# Patient Record
Sex: FEMALE | Race: WHITE
[De-identification: names, ages, dates, MRNs, and addresses within clinical notes are randomized per-mention and may not be internally consistent; named-entity substitution may affect disease eponyms.]

---

## 2019-04-08 ENCOUNTER — HOSPITAL ENCOUNTER (OUTPATIENT)
Dept: HOSPITAL 47 - RADUSWWP | Age: 44
Discharge: HOME | End: 2019-04-08
Attending: FAMILY MEDICINE
Payer: COMMERCIAL

## 2019-04-08 DIAGNOSIS — N85.8: Primary | ICD-10-CM

## 2019-04-08 PROCEDURE — 76830 TRANSVAGINAL US NON-OB: CPT

## 2019-04-09 NOTE — US
EXAMINATION TYPE: US transvaginal

 

DATE OF EXAM: 4/8/2019

 

COMPARISON: NONE

 

CLINICAL HISTORY: N39.8 Dysfunctional Uterine Bleeding.

 

TECHNIQUE:  Transvaginal (TV).  

 

Date of LMP:  3/11/19, patient still bleeding

 

EXAM MEASUREMENTS:

 

Uterus:  10.5 x 5.7 x 6.3 cm

Endometrial Stripe: not visualized

Right Ovary:  2.3 x 1.4 x 1.6cm 

Left Ovary:  1.8 x 1.3 x 1.1cm 

 

 

 

1. Uterus:  fibroid uterus, largest 2. measuring measuring 2.9 x 3.3 x 3.2cm, 2.3 x 2.3 x 2.2cm, mult
iple nabothians noted

 heterogeneous

2. Endometrium:  obscured by fibroid 

3. Right Ovary:  wnl

4. Left Ovary:  wnl

5. Bilateral Adnexa:  wnl

6. Posterior cul-de-sac: wnl

 

 

 

IMPRESSION:

1. Leiomyomatous change of the uterus.

## 2021-03-31 ENCOUNTER — HOSPITAL ENCOUNTER (EMERGENCY)
Dept: HOSPITAL 47 - EC | Age: 46
Discharge: HOME | End: 2021-03-31
Payer: COMMERCIAL

## 2021-03-31 VITALS — SYSTOLIC BLOOD PRESSURE: 110 MMHG | HEART RATE: 71 BPM | RESPIRATION RATE: 17 BRPM | DIASTOLIC BLOOD PRESSURE: 72 MMHG

## 2021-03-31 VITALS — TEMPERATURE: 98.1 F

## 2021-03-31 DIAGNOSIS — N13.2: Primary | ICD-10-CM

## 2021-03-31 LAB
ALBUMIN SERPL-MCNC: 4.8 G/DL (ref 3.5–5)
ALP SERPL-CCNC: 84 U/L (ref 38–126)
ALT SERPL-CCNC: 25 U/L (ref 4–34)
ANION GAP SERPL CALC-SCNC: 10 MMOL/L
AST SERPL-CCNC: 30 U/L (ref 14–36)
BASOPHILS # BLD AUTO: 0 K/UL (ref 0–0.2)
BASOPHILS NFR BLD AUTO: 0 %
BUN SERPL-SCNC: 23 MG/DL (ref 7–17)
CALCIUM SPEC-MCNC: 9.7 MG/DL (ref 8.4–10.2)
CHLORIDE SERPL-SCNC: 105 MMOL/L (ref 98–107)
CO2 SERPL-SCNC: 22 MMOL/L (ref 22–30)
EOSINOPHIL # BLD AUTO: 0.1 K/UL (ref 0–0.7)
EOSINOPHIL NFR BLD AUTO: 1 %
ERYTHROCYTE [DISTWIDTH] IN BLOOD BY AUTOMATED COUNT: 4.48 M/UL (ref 3.8–5.4)
ERYTHROCYTE [DISTWIDTH] IN BLOOD: 12.9 % (ref 11.5–15.5)
GLUCOSE SERPL-MCNC: 130 MG/DL (ref 74–99)
HCT VFR BLD AUTO: 39.3 % (ref 34–46)
HGB BLD-MCNC: 13 GM/DL (ref 11.4–16)
LIPASE SERPL-CCNC: 84 U/L (ref 23–300)
LYMPHOCYTES # SPEC AUTO: 1.1 K/UL (ref 1–4.8)
LYMPHOCYTES NFR SPEC AUTO: 9 %
MCH RBC QN AUTO: 29 PG (ref 25–35)
MCHC RBC AUTO-ENTMCNC: 33.1 G/DL (ref 31–37)
MCV RBC AUTO: 87.6 FL (ref 80–100)
MONOCYTES # BLD AUTO: 0.3 K/UL (ref 0–1)
MONOCYTES NFR BLD AUTO: 2 %
NEUTROPHILS # BLD AUTO: 10.6 K/UL (ref 1.3–7.7)
NEUTROPHILS NFR BLD AUTO: 88 %
PH UR: 5.5 [PH] (ref 5–8)
PLATELET # BLD AUTO: 285 K/UL (ref 150–450)
POTASSIUM SERPL-SCNC: 4.2 MMOL/L (ref 3.5–5.1)
PROT SERPL-MCNC: 7.5 G/DL (ref 6.3–8.2)
PROT UR QL: (no result)
RBC UR QL: >182 /HPF (ref 0–5)
SODIUM SERPL-SCNC: 137 MMOL/L (ref 137–145)
SP GR UR: 1.03 (ref 1–1.03)
SQUAMOUS UR QL AUTO: 6 /HPF (ref 0–4)
UROBILINOGEN UR QL STRIP: <2 MG/DL (ref ?–2)
WBC # BLD AUTO: 12.1 K/UL (ref 3.8–10.6)
WBC # UR AUTO: 4 /HPF (ref 0–5)

## 2021-03-31 PROCEDURE — 96374 THER/PROPH/DIAG INJ IV PUSH: CPT

## 2021-03-31 PROCEDURE — 96375 TX/PRO/DX INJ NEW DRUG ADDON: CPT

## 2021-03-31 PROCEDURE — 81001 URINALYSIS AUTO W/SCOPE: CPT

## 2021-03-31 PROCEDURE — 80053 COMPREHEN METABOLIC PANEL: CPT

## 2021-03-31 PROCEDURE — 96361 HYDRATE IV INFUSION ADD-ON: CPT

## 2021-03-31 PROCEDURE — 99284 EMERGENCY DEPT VISIT MOD MDM: CPT

## 2021-03-31 PROCEDURE — 36415 COLL VENOUS BLD VENIPUNCTURE: CPT

## 2021-03-31 PROCEDURE — 74177 CT ABD & PELVIS W/CONTRAST: CPT

## 2021-03-31 PROCEDURE — 85025 COMPLETE CBC W/AUTO DIFF WBC: CPT

## 2021-03-31 PROCEDURE — 83690 ASSAY OF LIPASE: CPT

## 2021-03-31 NOTE — CT
EXAMINATION TYPE: CT abdomen pelvis w con

 

DATE OF EXAM: 3/31/2021

 

COMPARISON: None

 

HISTORY: abd pain for 6 hours

 

CT DLP: 1679 mGycm

Automated exposure control for dose reduction was used.

 

CONTRAST: 

Performed with IV Contrast, patient injected with 100 mL of Isovue 300.

 

Images obtained from the diaphragm to the floor the pelvis with IV contrast.

 

Lung bases are clear. There is no pleural effusion. Heart size is normal. There is no pericardial eff
usion.

 

There are clips from cholecystectomy. Liver is intact. The bile ducts are not dilated. There is no pa
ncreatic mass. Stomach is intact. There are small calcified splenic granulomata.

 

There is no adrenal mass. Kidneys have normal size. There is left side mild hydronephrosis and obstru
cting 3 mm calculus proximal left ureter. This is close to the ureteral pelvic junction. There is no 
retroperitoneal adenopathy. Delayed images show delayed left side pyelogram. Bladder distends smoothl
y. Uterus is anteverted. There is probably 3 cm fibroid on the posterior wall of the uterus. There is
 no free fluid in the pelvis. There is no evidence of a mass. Appendix appears normal.

 

There are multiple sigmoid diverticula. There is no sign of diverticulitis. There is no mesenteric ed
ema. There is no ascites or free air. There is no evidence of bowel obstruction.

 

Lumbar vertebra have normal alignment. There is no compression fracture. The hip joints are intact. T
here is vacuum disc at L5-S1 with mild disc space narrowing.

 

IMPRESSION:

 

Obstructing small calculus at the left proximal ureter. Mild left-sided hydronephrosis. No other zofia
l calculus seen.

Normal appendix.

## 2021-03-31 NOTE — ED
General Adult HPI





- General


Chief complaint: Back Pain/Injury


Stated complaint: abd pain/back pain


Time Seen by Provider: 03/31/21 20:11


Source: patient, RN notes reviewed


Mode of arrival: ambulatory


Limitations: no limitations





- History of Present Illness


Initial comments: 





Patient is a 45-year-old female that presents to emergency department complainin

g of left flank pain.  She notes that she does not have a history of kidney 

stones, kidney infections, UTIs.  She notes that the pain is been happening for 

the last several hours and came out of nowhere.  Sheis a 10 out of 10 constant 

sharp pain.  She did note she has tenderness when she touches her left flank.  

She was in moderate distress and pain while laying in bed during the exam and 

interview.  She was asking for some pain medication with tears in her eyes.  She

denied any dysuria, discharge, chest pain shortness of breath headache nausea 

vomiting diarrhea constipation fever fatigue chills.





- Related Data


                                    Allergies











Allergy/AdvReac Type Severity Reaction Status Date / Time


 


No Known Allergies Allergy   Verified 03/31/21 18:47














Review of Systems


ROS Statement: 


Those systems with pertinent positive or pertinent negative responses have been 

documented in the HPI.





ROS Other: All systems not noted in ROS Statement are negative.





Past Medical History


Past Medical History: No Reported History


History of Any Multi-Drug Resistant Organisms: None Reported


Past Surgical History: Cholecystectomy


Additional Past Surgical History / Comment(s): ablation


Past Psychological History: No Psychological Hx Reported


Smoking Status: Never smoker


Past Alcohol Use History: Occasional


Past Drug Use History: None Reported





General Exam


Limitations: no limitations


General appearance: alert, in no apparent distress


Head exam: Present: atraumatic, normocephalic, normal inspection


Eye exam: Present: normal appearance, PERRL, EOMI.  Absent: scleral icterus, co

njunctival injection, periorbital swelling


Neck exam: Present: normal inspection.  Absent: tenderness, meningismus, 

lymphadenopathy


Respiratory exam: Present: normal lung sounds bilaterally.  Absent: respiratory 

distress, wheezes, rales, rhonchi, stridor


Cardiovascular Exam: Present: regular rate, normal rhythm, normal heart sounds. 

Absent: systolic murmur, diastolic murmur, rubs, gallop, clicks


GI/Abdominal exam: Present: soft, normal bowel sounds.  Absent: distended, 

tenderness, guarding, rebound, rigid


Extremities exam: Present: normal inspection, full ROM, normal capillary refill.

 Absent: tenderness, pedal edema, joint swelling, calf tenderness


Back exam: Present: normal inspection, CVA tenderness (L)


Psychiatric exam: Present: normal affect, normal mood


Skin exam: Present: warm, dry, intact, normal color.  Absent: rash





Course


                                   Vital Signs











  03/31/21





  18:44


 


Temperature 98.1 F


 


Pulse Rate 87


 


Respiratory 16





Rate 


 


Blood Pressure 117/85


 


O2 Sat by Pulse 98





Oximetry 














Medical Decision Making





- Medical Decision Making





45-year-old female complaining of left flank pain for several hours.


Labs, CT of the abdomen and pelvis, 4 mg of morphine, 1 L of normal saline 

ordered.


Labs: Blood work unremarkable, urinalysis shows large amount of blood calcium 

oxalate crystals mucus rare bacteria moderate urine yeast.


Case discussed with Dr. Owens, patient can discharge home with conservative 

management increase oral fluids.





- Lab Data


Result diagrams: 


                                 03/31/21 20:27





                                 03/31/21 20:27


                                   Lab Results











  03/31/21 03/31/21 03/31/21 Range/Units





  18:52 20:27 20:27 


 


WBC   12.1 H   (3.8-10.6)  k/uL


 


RBC   4.48   (3.80-5.40)  m/uL


 


Hgb   13.0   (11.4-16.0)  gm/dL


 


Hct   39.3   (34.0-46.0)  %


 


MCV   87.6   (80.0-100.0)  fL


 


MCH   29.0   (25.0-35.0)  pg


 


MCHC   33.1   (31.0-37.0)  g/dL


 


RDW   12.9   (11.5-15.5)  %


 


Plt Count   285   (150-450)  k/uL


 


MPV   7.3   


 


Neutrophils %   88   %


 


Lymphocytes %   9   %


 


Monocytes %   2   %


 


Eosinophils %   1   %


 


Basophils %   0   %


 


Neutrophils #   10.6 H   (1.3-7.7)  k/uL


 


Lymphocytes #   1.1   (1.0-4.8)  k/uL


 


Monocytes #   0.3   (0-1.0)  k/uL


 


Eosinophils #   0.1   (0-0.7)  k/uL


 


Basophils #   0.0   (0-0.2)  k/uL


 


Sodium    137  (137-145)  mmol/L


 


Potassium    4.2  (3.5-5.1)  mmol/L


 


Chloride    105  ()  mmol/L


 


Carbon Dioxide    22  (22-30)  mmol/L


 


Anion Gap    10  mmol/L


 


BUN    23 H  (7-17)  mg/dL


 


Creatinine    1.00  (0.52-1.04)  mg/dL


 


Est GFR (CKD-EPI)AfAm    79  (>60 ml/min/1.73 sqM)  


 


Est GFR (CKD-EPI)NonAf    68  (>60 ml/min/1.73 sqM)  


 


Glucose    130 H  (74-99)  mg/dL


 


Calcium    9.7  (8.4-10.2)  mg/dL


 


Total Bilirubin    0.5  (0.2-1.3)  mg/dL


 


AST    30  (14-36)  U/L


 


ALT    25  (4-34)  U/L


 


Alkaline Phosphatase    84  ()  U/L


 


Total Protein    7.5  (6.3-8.2)  g/dL


 


Albumin    4.8  (3.5-5.0)  g/dL


 


Lipase    84  ()  U/L


 


Urine Color  Light Red    


 


Urine Appearance  Cloudy H    (Clear)  


 


Urine pH  5.5    (5.0-8.0)  


 


Ur Specific Gravity  1.028    (1.001-1.035)  


 


Urine Protein  1+ H    (Negative)  


 


Urine Glucose (UA)  Negative    (Negative)  


 


Urine Ketones  Negative    (Negative)  


 


Urine Blood  Large H    (Negative)  


 


Urine Nitrite  Negative    (Negative)  


 


Urine Bilirubin  Negative    (Negative)  


 


Urine Urobilinogen  <2.0    (<2.0)  mg/dL


 


Ur Leukocyte Esterase  Trace H    (Negative)  


 


Urine RBC  >182 H    (0-5)  /hpf


 


Urine WBC  4    (0-5)  /hpf


 


Ur Squamous Epith Cells  6 H    (0-4)  /hpf


 


Calcium Oxalate Crystal  Occasional H    (None)  /hpf


 


Urine Bacteria  Rare H    (None)  /hpf


 


Urine Mucus  Many H    (None)  /hpf


 


Urine Yeast (Budding)  Moderate H    (None)  /hpf














Disposition


Clinical Impression: 


 Nephrolithiasis, Flank pain





Disposition: HOME SELF-CARE


Condition: Stable


Instructions (If sedation given, give patient instructions):  Kidney Stones (ED)


Additional Instructions: 


Please return to the Emergency Department if symptoms worsen or any other 

concerns.


Follow-up with primary care in 3-5 days.


Take Tylenol 3 as prescribed, can alternate with Motrin 800s as needed for sy

mptomatically control.


Increase oral fluid intake significantly to help flush the stone.


Is patient prescribed a controlled substance at d/c from ED?: Yes


When asked, does pt state using other controlled substances?: No


If prescribed controlled substance>3 days was MAPS reviewed?: Prescribed <3 Days


If opioid is for acute pain is fill amount 7 days or less?: Yes


Referrals: 


Alexa Arriaga MD [Primary Care Provider] - 1-2 days


Time of Disposition: 22:11

## 2023-03-13 ENCOUNTER — HOSPITAL ENCOUNTER (OUTPATIENT)
Dept: HOSPITAL 47 - LABWHC1 | Age: 48
Discharge: HOME | End: 2023-03-13
Attending: PSYCHIATRY & NEUROLOGY
Payer: COMMERCIAL

## 2023-03-13 DIAGNOSIS — I49.9: Primary | ICD-10-CM

## 2023-03-13 PROCEDURE — 36415 COLL VENOUS BLD VENIPUNCTURE: CPT

## 2023-03-13 PROCEDURE — 93005 ELECTROCARDIOGRAM TRACING: CPT

## 2025-06-10 ENCOUNTER — HOSPITAL ENCOUNTER (OUTPATIENT)
Dept: HOSPITAL 47 - LABWHC1 | Age: 50
Discharge: HOME | End: 2025-06-10
Attending: FAMILY MEDICINE
Payer: COMMERCIAL

## 2025-06-10 DIAGNOSIS — E04.1: Primary | ICD-10-CM

## 2025-06-10 LAB
T4 FREE SERPL-MCNC: 1.07 NG/DL (ref 0.8–1.8)
TSH SERPL DL<=0.005 MIU/L-ACNC: 0.95 UIU/ML (ref 0.35–5.5)

## 2025-06-10 PROCEDURE — 84439 ASSAY OF FREE THYROXINE: CPT

## 2025-06-10 PROCEDURE — 84443 ASSAY THYROID STIM HORMONE: CPT

## 2025-06-10 PROCEDURE — 84480 ASSAY TRIIODOTHYRONINE (T3): CPT

## 2025-06-10 PROCEDURE — 36415 COLL VENOUS BLD VENIPUNCTURE: CPT
